# Patient Record
Sex: MALE | Race: WHITE | NOT HISPANIC OR LATINO | Employment: FULL TIME | URBAN - METROPOLITAN AREA
[De-identification: names, ages, dates, MRNs, and addresses within clinical notes are randomized per-mention and may not be internally consistent; named-entity substitution may affect disease eponyms.]

---

## 2024-06-24 ENCOUNTER — OFFICE VISIT (OUTPATIENT)
Dept: INTERNAL MEDICINE | Facility: CLINIC | Age: 19
End: 2024-06-24
Payer: COMMERCIAL

## 2024-06-24 VITALS
HEIGHT: 71 IN | WEIGHT: 172.4 LBS | SYSTOLIC BLOOD PRESSURE: 112 MMHG | RESPIRATION RATE: 14 BRPM | TEMPERATURE: 97.3 F | HEART RATE: 72 BPM | BODY MASS INDEX: 24.14 KG/M2 | DIASTOLIC BLOOD PRESSURE: 86 MMHG

## 2024-06-24 DIAGNOSIS — Z00.00 WELLNESS EXAMINATION: Primary | ICD-10-CM

## 2024-06-24 DIAGNOSIS — Z13.6 ENCOUNTER FOR LIPID SCREENING FOR CARDIOVASCULAR DISEASE: ICD-10-CM

## 2024-06-24 DIAGNOSIS — Z13.220 ENCOUNTER FOR LIPID SCREENING FOR CARDIOVASCULAR DISEASE: ICD-10-CM

## 2024-06-24 LAB
ALBUMIN SERPL-MCNC: 4.8 G/DL (ref 3.5–5.2)
ALBUMIN/GLOB SERPL: 2.3 G/DL
ALP SERPL-CCNC: 60 U/L (ref 39–117)
ALT SERPL W P-5'-P-CCNC: 13 U/L (ref 1–41)
ANION GAP SERPL CALCULATED.3IONS-SCNC: 10.5 MMOL/L (ref 5–15)
AST SERPL-CCNC: 23 U/L (ref 1–40)
BASOPHILS # BLD AUTO: 0.02 10*3/MM3 (ref 0–0.2)
BASOPHILS NFR BLD AUTO: 0.3 % (ref 0–1.5)
BILIRUB SERPL-MCNC: 0.9 MG/DL (ref 0–1.2)
BUN SERPL-MCNC: 12 MG/DL (ref 6–20)
BUN/CREAT SERPL: 10.8 (ref 7–25)
CALCIUM SPEC-SCNC: 9.8 MG/DL (ref 8.6–10.5)
CHLORIDE SERPL-SCNC: 103 MMOL/L (ref 98–107)
CHOLEST SERPL-MCNC: 153 MG/DL (ref 0–200)
CO2 SERPL-SCNC: 26.5 MMOL/L (ref 22–29)
CREAT SERPL-MCNC: 1.11 MG/DL (ref 0.76–1.27)
DEPRECATED RDW RBC AUTO: 38.8 FL (ref 37–54)
EGFRCR SERPLBLD CKD-EPI 2021: 96.9 ML/MIN/1.73
EOSINOPHIL # BLD AUTO: 0.07 10*3/MM3 (ref 0–0.4)
EOSINOPHIL NFR BLD AUTO: 1.1 % (ref 0.3–6.2)
ERYTHROCYTE [DISTWIDTH] IN BLOOD BY AUTOMATED COUNT: 11.9 % (ref 12.3–15.4)
GLOBULIN UR ELPH-MCNC: 2.1 GM/DL
GLUCOSE SERPL-MCNC: 99 MG/DL (ref 65–99)
HCT VFR BLD AUTO: 45.9 % (ref 37.5–51)
HDLC SERPL-MCNC: 63 MG/DL (ref 40–60)
HGB BLD-MCNC: 16.1 G/DL (ref 13–17.7)
IMM GRANULOCYTES # BLD AUTO: 0.01 10*3/MM3 (ref 0–0.05)
IMM GRANULOCYTES NFR BLD AUTO: 0.2 % (ref 0–0.5)
LDLC SERPL CALC-MCNC: 77 MG/DL (ref 0–100)
LDLC/HDLC SERPL: 1.23 {RATIO}
LYMPHOCYTES # BLD AUTO: 1.65 10*3/MM3 (ref 0.7–3.1)
LYMPHOCYTES NFR BLD AUTO: 25.5 % (ref 19.6–45.3)
MCH RBC QN AUTO: 31.4 PG (ref 26.6–33)
MCHC RBC AUTO-ENTMCNC: 35.1 G/DL (ref 31.5–35.7)
MCV RBC AUTO: 89.6 FL (ref 79–97)
MONOCYTES # BLD AUTO: 0.42 10*3/MM3 (ref 0.1–0.9)
MONOCYTES NFR BLD AUTO: 6.5 % (ref 5–12)
NEUTROPHILS NFR BLD AUTO: 4.29 10*3/MM3 (ref 1.7–7)
NEUTROPHILS NFR BLD AUTO: 66.4 % (ref 42.7–76)
NRBC BLD AUTO-RTO: 0 /100 WBC (ref 0–0.2)
PLATELET # BLD AUTO: 198 10*3/MM3 (ref 140–450)
PMV BLD AUTO: 11.1 FL (ref 6–12)
POTASSIUM SERPL-SCNC: 4.9 MMOL/L (ref 3.5–5.2)
PROT SERPL-MCNC: 6.9 G/DL (ref 6–8.5)
RBC # BLD AUTO: 5.12 10*6/MM3 (ref 4.14–5.8)
SODIUM SERPL-SCNC: 140 MMOL/L (ref 136–145)
TRIGL SERPL-MCNC: 63 MG/DL (ref 0–150)
TSH SERPL DL<=0.05 MIU/L-ACNC: 2.24 UIU/ML (ref 0.27–4.2)
VLDLC SERPL-MCNC: 13 MG/DL (ref 5–40)
WBC NRBC COR # BLD AUTO: 6.46 10*3/MM3 (ref 3.4–10.8)

## 2024-06-24 PROCEDURE — 85025 COMPLETE CBC W/AUTO DIFF WBC: CPT | Performed by: NURSE PRACTITIONER

## 2024-06-24 PROCEDURE — 36415 COLL VENOUS BLD VENIPUNCTURE: CPT | Performed by: NURSE PRACTITIONER

## 2024-06-24 PROCEDURE — 80061 LIPID PANEL: CPT | Performed by: NURSE PRACTITIONER

## 2024-06-24 PROCEDURE — 84443 ASSAY THYROID STIM HORMONE: CPT | Performed by: NURSE PRACTITIONER

## 2024-06-24 PROCEDURE — 99395 PREV VISIT EST AGE 18-39: CPT | Performed by: NURSE PRACTITIONER

## 2024-06-24 PROCEDURE — 80053 COMPREHEN METABOLIC PANEL: CPT | Performed by: NURSE PRACTITIONER

## 2024-06-24 NOTE — PROGRESS NOTES
Male Physical Note      Patient Name: Robb Farias  : 2005   MRN: [id number]     Chief Complaint:    Chief Complaint   Patient presents with    Abdominal Pain     New patient. Patient had abdominal pain and nausea after eating last week  Annual Wellness        History of Present Illness: Robb Farias is a 19 y.o. male who is here today for their annual health maintenance and physical.    History of Present Illness  The patient presents for evaluation of multiple medical concerns.    The patient experienced gastrointestinal discomfort approximately 2 weeks ago, which has since resolved. He experienced postprandial abdominal pain after consuming only 2 bites of food during a vacation last week, which was alleviated by defecation. Currently, he reports no abdominal pain, nausea, vomiting, or constipation.   His sleep patterns are normal. He has no history of abdominal issues.   He experienced a headache last week, which has since improved.   He denies any urinary or bowel irregularities.   His mood remains stable, and he denies any symptoms of depression or anxiety.   He denies any systemic symptoms such as fever or chills.   He drinks 3 drinks of alcohol a week. He used to use marijuana, but not anymore. He works as a .   He is not aware of any colon or prostate cancer in his family as he is adopted.        Subjective      Review of Systems:   Review of Systems    Past Medical History: History reviewed. No pertinent past medical history.    Past Surgical History: History reviewed. No pertinent surgical history.    Family History: History reviewed. No pertinent family history.    Social History:   Social History     Socioeconomic History    Marital status: Single   Tobacco Use    Smoking status: Never    Smokeless tobacco: Never   Vaping Use    Vaping status: Never Used   Substance and Sexual Activity    Alcohol use: Yes     Alcohol/week: 3.0 standard drinks of alcohol     Comment: Here and there     "Drug use: Yes     Types: Marijuana     Comment: I stop 10 days age    Sexual activity: Yes     Partners: Female     Birth control/protection: Condom       Medications:   No current outpatient medications on file.    Allergies:   No Known Allergies      Colorectal Screening:     Last Completed Colonoscopy       This patient has no relevant Health Maintenance data.           Depression: PHQ-2/9 Depression Screening  Little interest or pleasure in doing things?     Feeling down, depressed, or hopeless?     PHQ-2 Total Score     PHQ-9 Total Score 0       Objective     Physical Exam:  Vital Signs:   Vitals:    06/24/24 1047   BP: 112/86   BP Location: Right arm   Patient Position: Sitting   Cuff Size: Adult   Pulse: 72   Resp: 14   Temp: 97.3 °F (36.3 °C)   TempSrc: Infrared   Weight: 78.2 kg (172 lb 6.4 oz)   Height: 180.3 cm (71\")   PainSc: 0-No pain     Body mass index is 24.04 kg/m². BMI is within normal parameters. No other follow-up for BMI required.    Information provided on after visit summary.    Physical Exam  Vitals and nursing note reviewed.   Constitutional:       General: He is not in acute distress.     Appearance: Normal appearance. He is not ill-appearing.   HENT:      Head: Normocephalic.      Right Ear: There is impacted cerumen.      Left Ear: Tympanic membrane, ear canal and external ear normal. There is no impacted cerumen.      Nose: No nasal tenderness or rhinorrhea.      Mouth/Throat:      Mouth: Mucous membranes are moist.      Pharynx: Oropharynx is clear. No oropharyngeal exudate or posterior oropharyngeal erythema.   Eyes:      General:         Right eye: No discharge.         Left eye: No discharge.      Extraocular Movements: Extraocular movements intact.      Conjunctiva/sclera: Conjunctivae normal.      Pupils: Pupils are equal, round, and reactive to light.   Neck:      Thyroid: No thyromegaly.   Cardiovascular:      Rate and Rhythm: Normal rate and regular rhythm.      Pulses: Normal " pulses.      Heart sounds: Normal heart sounds. No murmur heard.     No gallop.   Pulmonary:      Effort: Pulmonary effort is normal.      Breath sounds: Normal breath sounds. No wheezing, rhonchi or rales.   Abdominal:      General: Bowel sounds are normal.      Palpations: Abdomen is soft. There is no mass.      Tenderness: There is no abdominal tenderness. There is no right CVA tenderness or left CVA tenderness.   Musculoskeletal:         General: No swelling or tenderness. Normal range of motion.      Cervical back: Normal range of motion.      Right lower leg: No edema.      Left lower leg: No edema.   Lymphadenopathy:      Cervical: No cervical adenopathy.   Skin:     General: Skin is warm and dry.      Capillary Refill: Capillary refill takes less than 2 seconds.      Findings: No erythema or rash.   Neurological:      General: No focal deficit present.      Mental Status: He is alert and oriented to person, place, and time.      Motor: No weakness.   Psychiatric:         Mood and Affect: Mood normal.         Behavior: Behavior is cooperative.         Cognition and Memory: He does not exhibit impaired recent memory.       Physical Exam      Procedures    Assessment / Plan      Assessment/Plan:   Diagnoses and all orders for this visit:    1. Wellness examination (Primary)  -     Comprehensive Metabolic Panel; Future  -     CBC & Differential; Future  -     TSH Rfx On Abnormal To Free T4; Future    2. Encounter for lipid screening for cardiovascular disease  -     Lipid Panel; Future       Assessment & Plan  1. Abdominal pain.  The patient's abdominal discomfort has shown improvement. The patient is advised to monitor his condition. Should the abdominal pain recur, a follow-up appointment will be scheduled.    2. Cerumen impaction.  The patient is recommended to use over-the-counter medication to soften the cerumen. Should the cerumen impaction persist, the patient is to inform us, and we will proceed with an  ear irrigation procedure.      I explained and discussed patient's condition and plan of care.  Discussed when to follow-up.  Discussed possible red flags and how to follow-up with those.  Viewed patient's medications and discussed common side effects. Patient to continue current medications as advised.  Be compliant with medications. Patient to let me know if he has any changes in health, does not tolerate medication, or any future concerns about treatment. Patient verbalized understanding and agreement with plan of care.   Discussed current problems may cause a copay for this visit. Patient verbalized an understanding and agreement of plan.    Follow Up:   No follow-ups on file.  Patient or patient representative verbalized consent for the use of Ambient Listening during the visit with  ALYX Best for chart documentation. 6/24/2024  11:06 EDT      Health Maintenance, Male  A healthy lifestyle and preventive care is important for your health and wellness. Ask your health care provider about what schedule of regular examinations is right for you.  What should I know about weight and diet?    Eat a Healthy Diet  Eat plenty of vegetables, fruits, whole grains, low-fat dairy products, and lean protein.  Do not eat a lot of foods high in solid fats, added sugars, or salt.     Maintain a Healthy Weight  Regular exercise can help you achieve or maintain a healthy weight. You should:  Do at least 150 minutes of exercise each week. The exercise should increase your heart rate and make you sweat (moderate-intensity exercise).  Do strength-training exercises at least twice a week.     Watch Your Levels of Cholesterol and Blood Lipids  Have your blood tested for lipids and cholesterol every 5 years starting at 35 years of age. If you are at high risk for heart disease, you should start having your blood tested when you are 20 years old. You may need to have your cholesterol levels checked more often if:  Your lipid  or cholesterol levels are high.  You are older than 50 years of age.  You are at high risk for heart disease.     What should I know about cancer screening?  Many types of cancers can be detected early and may often be prevented.  Lung Cancer  You should be screened every year for lung cancer if:  You are a current smoker who has smoked for at least 30 years.  You are a former smoker who has quit within the past 15 years.  Talk to your health care provider about your screening options, when you should start screening, and how often you should be screened.     Colorectal Cancer  Routine colorectal cancer screening usually begins at 50 years of age and should be repeated every 5-10 years until you are 75 years old. You may need to be screened more often if early forms of precancerous polyps or small growths are found. Your health care provider may recommend screening at an earlier age if you have risk factors for colon cancer.  Your health care provider may recommend using home test kits to check for hidden blood in the stool.  A small camera at the end of a tube can be used to examine your colon (sigmoidoscopy or colonoscopy). This checks for the earliest forms of colorectal cancer.     Prostate and Testicular Cancer  Depending on your age and overall health, your health care provider may do certain tests to screen for prostate and testicular cancer.  Talk to your health care provider about any symptoms or concerns you have about testicular or prostate cancer.     Skin Cancer  Check your skin from head to toe regularly.  Tell your health care provider about any new moles or changes in moles, especially if:  There is a change in a mole’s size, shape, or color.  You have a mole that is larger than a pencil eraser.  Always use sunscreen. Apply sunscreen liberally and repeat throughout the day.  Protect yourself by wearing long sleeves, pants, a wide-brimmed hat, and sunglasses when outside.     What should I know about  heart disease, diabetes, and high blood pressure?  If you are 18-39 years of age, have your blood pressure checked every 3-5 years. If you are 40 years of age or older, have your blood pressure checked every year. You should have your blood pressure measured twice--once when you are at a hospital or clinic, and once when you are not at a hospital or clinic. Record the average of the two measurements. To check your blood pressure when you are not at a hospital or clinic, you can use:  An automated blood pressure machine at a pharmacy.  A home blood pressure monitor.  Talk to your health care provider about your target blood pressure.  If you are between 45-79 years old, ask your health care provider if you should take aspirin to prevent heart disease.  Have regular diabetes screenings by checking your fasting blood sugar level.  If you are at a normal weight and have a low risk for diabetes, have this test once every three years after the age of 45.  If you are overweight and have a high risk for diabetes, consider being tested at a younger age or more often.  A one-time screening for abdominal aortic aneurysm (AAA) by ultrasound is recommended for men aged 65-75 years who are current or former smokers.  What should I know about preventing infection?  Hepatitis B  If you have a higher risk for hepatitis B, you should be screened for this virus. Talk with your health care provider to find out if you are at risk for hepatitis B infection.  Hepatitis C  Blood testing is recommended for:  Everyone born from 1945 through 1965.  Anyone with known risk factors for hepatitis C.     Sexually Transmitted Diseases (STDs)  You should be screened each year for STDs including gonorrhea and chlamydia if:  You are sexually active and are younger than 24 years of age.  You are older than 24 years of age and your health care provider tells you that you are at risk for this type of infection.  Your sexual activity has changed since you  were last screened and you are at an increased risk for chlamydia or gonorrhea. Ask your health care provider if you are at risk.  Talk with your health care provider about whether you are at high risk of being infected with HIV. Your health care provider may recommend a prescription medicine to help prevent HIV infection.     What else can I do?    Schedule regular health, dental, and eye exams.  Stay current with your vaccines (immunizations).  Do not use any tobacco products, such as cigarettes, chewing tobacco, and e-cigarettes. If you need help quitting, ask your health care provider.  Limit alcohol intake to no more than 2 drinks per day. One drink equals 12 ounces of beer, 5 ounces of wine, or 1½ ounces of hard liquor.  Do not use street drugs.  Do not share needles.  Ask your health care provider for help if you need support or information about quitting drugs.  Tell your health care provider if you often feel depressed.  Tell your health care provider if you have ever been abused or do not feel safe at home.      This information is not intended to replace advice given to you by your health care provider. Make sure you discuss any questions you have with your health care provider.  Document Released: 06/15/2009 Document Revised: 08/16/2017 Document Reviewed: 09/20/2016  [employer] Interactive Patient Education © 2018 [employer]      Robb Farias voices understanding and acceptance of this advice and will call back with any further questions or concerns. AVS with preventive healthcare tips printed for patient.     ALYX Best   [facility] [place]

## 2024-09-05 ENCOUNTER — TELEPHONE (OUTPATIENT)
Dept: INTERNAL MEDICINE | Facility: CLINIC | Age: 19
End: 2024-09-05

## 2024-09-05 NOTE — TELEPHONE ENCOUNTER
"Caller: Robb Fraias \"HILDA\"    Relationship to patient: Self    Best call back number: [phone]     Chief complaint: EARS CLOGGED-WART REMOVAL    Type of visit: IN OFFICE PROCEDURE    Requested date: TODAY     Additional notes:PLEASE CALL AS SOON AS POSSIBLE  "

## 2024-09-11 ENCOUNTER — OFFICE VISIT (OUTPATIENT)
Dept: INTERNAL MEDICINE | Facility: CLINIC | Age: 19
End: 2024-09-11
Payer: COMMERCIAL

## 2024-09-11 VITALS
SYSTOLIC BLOOD PRESSURE: 106 MMHG | DIASTOLIC BLOOD PRESSURE: 68 MMHG | TEMPERATURE: 98.2 F | HEART RATE: 68 BPM | WEIGHT: 178.13 LBS | BODY MASS INDEX: 24.84 KG/M2 | RESPIRATION RATE: 16 BRPM

## 2024-09-11 DIAGNOSIS — B07.9 VIRAL WART ON FINGER: Primary | ICD-10-CM

## 2024-09-11 PROCEDURE — 99213 OFFICE O/P EST LOW 20 MIN: CPT | Performed by: INTERNAL MEDICINE

## 2024-09-11 RX ORDER — IMIQUIMOD 12.5 MG/.25G
CREAM TOPICAL
Qty: 12 EACH | Refills: 2 | Status: SHIPPED | OUTPATIENT
Start: 2024-09-12

## 2024-09-11 NOTE — PROGRESS NOTES
Chief Complaint  Verrucous Vulgaris (Left index finger) and Cerumen Impaction (Both ears)    Subjective    Robb Farias is a 19 y.o. male.     Robb Farias presents to [facility] for     History of Present Illness  The patient presents for evaluation of multiple medical concerns.    He experienced a sudden loss of hearing today, which he attempted to alleviate by using an over-the-counter product from [employer].    He has been dealing with a wart on his left index finger for approximately a year. The wart occasionally grows, at which point he resorts to cutting it off. He has previously tried freezing treatments without success. He also used an over-the-counter Band-Aid for the wart.       The following portions of the patient's history were reviewed and updated as appropriate: allergies, current medications, past family history, past medical history, past social history, past surgical history, and problem list.    Review of Systems    Objective   Body mass index is 24.84 kg/m².  BMI is within normal parameters. No other follow-up for BMI required.       Vital Signs:   /68 (BP Location: Right arm, Patient Position: Sitting, Cuff Size: Adult)   Pulse 68   Temp 98.2 °F (36.8 °C) (Temporal)   Resp 16   Wt 80.8 kg (178 lb 2 oz)   BMI 24.84 kg/m²       Physical Exam  Patient is alert x3, no distress.  Head is normocephalic, atraumatic. Pupils equal, light accommodation. Extraocular muscles intact. Right ear canal is completely occluded with dark brown cerumen. Left tympanic membrane is clear.  On the left index finger, digit number 2, there is a small papular wart. Over-the-counter remedies have been tried but have not proven beneficial or relieving.       Results              Assessment and Plan  Diagnoses and all orders for this visit:  Assessment & Plan  1. Right cerumen impaction.  Cerumen was successfully removed using a peroxide-based solution, which he  tolerated well.    2. Left index finger wart.  Topical cryonitro application was administered. He is instructed to apply Aldara 5% cream (imiquimod) twice a week. Potential side effects and precautions associated with the wart treatment were discussed.    Follow-up  He will follow up in 6 weeks to monitor the progress of the wart treatment.       Diagnoses and all orders for this visit:    1. Viral wart on finger (Primary)  -     imiquimod (ALDARA) 5 % cream; Apply to wart on left index finger  twice a week  Dispense: 12 each; Refill: 2      Side effects and precautions of the new medication(s) have been discussed with patient  I have answered patients questions thoroughly to their understanding.  If patient should experience any side effects from the medication, a follow up appointment should be made.         Follow Up   No follow-ups on file.  Patient was given instructions and counseling regarding his condition or for health maintenance advice. Please see specific information pulled into the AVS if appropriate.     Patient or patient representative verbalized consent for the use of Ambient Listening during the visit with  Alex Zabala MD for chart documentation. 9/11/2024  15:18 EDT

## 2024-11-11 ENCOUNTER — HOSPITAL ENCOUNTER (EMERGENCY)
Facility: HOSPITAL | Age: 19
Discharge: HOME | End: 2024-11-11
Attending: EMERGENCY MEDICINE | Admitting: EMERGENCY MEDICINE
Payer: COMMERCIAL

## 2024-11-11 ENCOUNTER — APPOINTMENT (OUTPATIENT)
Dept: GENERAL RADIOLOGY | Facility: HOSPITAL | Age: 19
End: 2024-11-11
Payer: COMMERCIAL

## 2024-11-11 VITALS
HEART RATE: 86 BPM | OXYGEN SATURATION: 98 % | SYSTOLIC BLOOD PRESSURE: 127 MMHG | RESPIRATION RATE: 16 BRPM | BODY MASS INDEX: 24.5 KG/M2 | WEIGHT: 175 LBS | DIASTOLIC BLOOD PRESSURE: 78 MMHG | HEIGHT: 71 IN | TEMPERATURE: 99.3 F

## 2024-11-11 DIAGNOSIS — R07.9 CHEST PAIN, UNSPECIFIED TYPE: ICD-10-CM

## 2024-11-11 DIAGNOSIS — B34.9 ACUTE VIRAL SYNDROME: Primary | ICD-10-CM

## 2024-11-11 DIAGNOSIS — R82.4 KETONURIA: ICD-10-CM

## 2024-11-11 DIAGNOSIS — R13.10 ODYNOPHAGIA: ICD-10-CM

## 2024-11-11 DIAGNOSIS — E86.0 DEHYDRATION: ICD-10-CM

## 2024-11-11 LAB
ALBUMIN SERPL-MCNC: 4.3 G/DL (ref 3.5–5.2)
ALBUMIN/GLOB SERPL: 1.5 G/DL
ALP SERPL-CCNC: 61 U/L (ref 39–117)
ALT SERPL W P-5'-P-CCNC: 27 U/L (ref 1–41)
ANION GAP SERPL CALCULATED.3IONS-SCNC: 10 MMOL/L (ref 5–15)
AST SERPL-CCNC: 37 U/L (ref 1–40)
B PARAPERT DNA SPEC QL NAA+PROBE: NOT DETECTED
B PERT DNA SPEC QL NAA+PROBE: NOT DETECTED
BASOPHILS # BLD MANUAL: 0.05 10*3/MM3 (ref 0–0.2)
BASOPHILS NFR BLD MANUAL: 1 % (ref 0–1.5)
BILIRUB SERPL-MCNC: 0.5 MG/DL (ref 0–1.2)
BILIRUB UR QL STRIP: ABNORMAL
BUN SERPL-MCNC: 12 MG/DL (ref 6–20)
BUN/CREAT SERPL: 10.3 (ref 7–25)
C PNEUM DNA NPH QL NAA+NON-PROBE: NOT DETECTED
CALCIUM SPEC-SCNC: 9.4 MG/DL (ref 8.6–10.5)
CHLORIDE SERPL-SCNC: 100 MMOL/L (ref 98–107)
CLARITY UR: CLEAR
CO2 SERPL-SCNC: 26 MMOL/L (ref 22–29)
COLOR UR: ABNORMAL
CREAT SERPL-MCNC: 1.17 MG/DL (ref 0.76–1.27)
CRP SERPL-MCNC: 4.14 MG/DL (ref 0–0.5)
D DIMER PPP FEU-MCNC: 0.41 MCGFEU/ML (ref 0–0.5)
DEPRECATED RDW RBC AUTO: 34.5 FL (ref 37–54)
EGFRCR SERPLBLD CKD-EPI 2021: 91 ML/MIN/1.73
EOSINOPHIL # BLD MANUAL: 0 10*3/MM3 (ref 0–0.4)
EOSINOPHIL NFR BLD MANUAL: 0 % (ref 0.3–6.2)
ERYTHROCYTE [DISTWIDTH] IN BLOOD BY AUTOMATED COUNT: 10.8 % (ref 12.3–15.4)
ERYTHROCYTE [SEDIMENTATION RATE] IN BLOOD: 10 MM/HR (ref 0–15)
FLUAV SUBTYP SPEC NAA+PROBE: NOT DETECTED
FLUBV RNA ISLT QL NAA+PROBE: NOT DETECTED
GLOBULIN UR ELPH-MCNC: 2.8 GM/DL
GLUCOSE SERPL-MCNC: 86 MG/DL (ref 65–99)
GLUCOSE UR STRIP-MCNC: NEGATIVE MG/DL
HADV DNA SPEC NAA+PROBE: NOT DETECTED
HCOV 229E RNA SPEC QL NAA+PROBE: NOT DETECTED
HCOV HKU1 RNA SPEC QL NAA+PROBE: NOT DETECTED
HCOV NL63 RNA SPEC QL NAA+PROBE: NOT DETECTED
HCOV OC43 RNA SPEC QL NAA+PROBE: NOT DETECTED
HCT VFR BLD AUTO: 41.9 % (ref 37.5–51)
HETEROPH AB SER QL LA: NEGATIVE
HGB BLD-MCNC: 14.9 G/DL (ref 13–17.7)
HGB UR QL STRIP.AUTO: NEGATIVE
HMPV RNA NPH QL NAA+NON-PROBE: NOT DETECTED
HOLD SPECIMEN: NORMAL
HPIV1 RNA ISLT QL NAA+PROBE: NOT DETECTED
HPIV2 RNA SPEC QL NAA+PROBE: NOT DETECTED
HPIV3 RNA NPH QL NAA+PROBE: NOT DETECTED
HPIV4 P GENE NPH QL NAA+PROBE: NOT DETECTED
KETONES UR QL STRIP: ABNORMAL
LEUKOCYTE ESTERASE UR QL STRIP.AUTO: NEGATIVE
LYMPHOCYTES # BLD MANUAL: 1.56 10*3/MM3 (ref 0.7–3.1)
LYMPHOCYTES NFR BLD MANUAL: 22 % (ref 5–12)
M PNEUMO IGG SER IA-ACNC: NOT DETECTED
MCH RBC QN AUTO: 30.7 PG (ref 26.6–33)
MCHC RBC AUTO-ENTMCNC: 35.6 G/DL (ref 31.5–35.7)
MCV RBC AUTO: 86.4 FL (ref 79–97)
MONOCYTES # BLD: 1.07 10*3/MM3 (ref 0.1–0.9)
NEUTROPHILS # BLD AUTO: 2.19 10*3/MM3 (ref 1.7–7)
NEUTROPHILS NFR BLD MANUAL: 38 % (ref 42.7–76)
NEUTS BAND NFR BLD MANUAL: 7 % (ref 0–5)
NITRITE UR QL STRIP: NEGATIVE
NRBC SPEC MANUAL: 0 /100 WBC (ref 0–0.2)
PH UR STRIP.AUTO: 6.5 [PH] (ref 5–8)
PLAT MORPH BLD: NORMAL
PLATELET # BLD AUTO: 149 10*3/MM3 (ref 140–450)
PMV BLD AUTO: 10.7 FL (ref 6–12)
POTASSIUM SERPL-SCNC: 4.2 MMOL/L (ref 3.5–5.2)
PROT SERPL-MCNC: 7.1 G/DL (ref 6–8.5)
PROT UR QL STRIP: ABNORMAL
RBC # BLD AUTO: 4.85 10*6/MM3 (ref 4.14–5.8)
RBC MORPH BLD: NORMAL
RHINOVIRUS RNA SPEC NAA+PROBE: NOT DETECTED
RSV RNA NPH QL NAA+NON-PROBE: NOT DETECTED
SARS-COV-2 RNA NPH QL NAA+NON-PROBE: NOT DETECTED
SODIUM SERPL-SCNC: 136 MMOL/L (ref 136–145)
SP GR UR STRIP: 1.02 (ref 1–1.03)
TROPONIN T SERPL HS-MCNC: <6 NG/L
UROBILINOGEN UR QL STRIP: ABNORMAL
VARIANT LYMPHS NFR BLD MANUAL: 25 % (ref 19.6–45.3)
VARIANT LYMPHS NFR BLD MANUAL: 7 % (ref 0–5)
WBC MORPH BLD: NORMAL
WBC NRBC COR # BLD AUTO: 4.86 10*3/MM3 (ref 3.4–10.8)
WHOLE BLOOD HOLD SPECIMEN: NORMAL

## 2024-11-11 PROCEDURE — 0202U NFCT DS 22 TRGT SARS-COV-2: CPT | Performed by: EMERGENCY MEDICINE

## 2024-11-11 PROCEDURE — 93005 ELECTROCARDIOGRAM TRACING: CPT | Performed by: EMERGENCY MEDICINE

## 2024-11-11 PROCEDURE — 86308 HETEROPHILE ANTIBODY SCREEN: CPT | Performed by: EMERGENCY MEDICINE

## 2024-11-11 PROCEDURE — 71045 X-RAY EXAM CHEST 1 VIEW: CPT

## 2024-11-11 PROCEDURE — 85025 COMPLETE CBC W/AUTO DIFF WBC: CPT | Performed by: EMERGENCY MEDICINE

## 2024-11-11 PROCEDURE — 99284 EMERGENCY DEPT VISIT MOD MDM: CPT

## 2024-11-11 PROCEDURE — 86140 C-REACTIVE PROTEIN: CPT | Performed by: EMERGENCY MEDICINE

## 2024-11-11 PROCEDURE — 93005 ELECTROCARDIOGRAM TRACING: CPT

## 2024-11-11 PROCEDURE — 81003 URINALYSIS AUTO W/O SCOPE: CPT | Performed by: EMERGENCY MEDICINE

## 2024-11-11 PROCEDURE — 85652 RBC SED RATE AUTOMATED: CPT | Performed by: EMERGENCY MEDICINE

## 2024-11-11 PROCEDURE — 85379 FIBRIN DEGRADATION QUANT: CPT | Performed by: EMERGENCY MEDICINE

## 2024-11-11 PROCEDURE — 84484 ASSAY OF TROPONIN QUANT: CPT | Performed by: EMERGENCY MEDICINE

## 2024-11-11 PROCEDURE — 36415 COLL VENOUS BLD VENIPUNCTURE: CPT

## 2024-11-11 PROCEDURE — 80053 COMPREHEN METABOLIC PANEL: CPT | Performed by: EMERGENCY MEDICINE

## 2024-11-11 PROCEDURE — 85007 BL SMEAR W/DIFF WBC COUNT: CPT | Performed by: EMERGENCY MEDICINE

## 2024-11-11 RX ORDER — ALUMINA, MAGNESIA, AND SIMETHICONE 2400; 2400; 240 MG/30ML; MG/30ML; MG/30ML
15 SUSPENSION ORAL ONCE
Status: COMPLETED | OUTPATIENT
Start: 2024-11-11 | End: 2024-11-11

## 2024-11-11 RX ORDER — SODIUM CHLORIDE 0.9 % (FLUSH) 0.9 %
10 SYRINGE (ML) INJECTION AS NEEDED
Status: DISCONTINUED | OUTPATIENT
Start: 2024-11-11 | End: 2024-11-11 | Stop reason: HOSPADM

## 2024-11-11 RX ORDER — KETOROLAC TROMETHAMINE 15 MG/ML
15 INJECTION, SOLUTION INTRAMUSCULAR; INTRAVENOUS ONCE
Status: DISCONTINUED | OUTPATIENT
Start: 2024-11-11 | End: 2024-11-11

## 2024-11-11 RX ORDER — FAMOTIDINE 10 MG/ML
20 INJECTION, SOLUTION INTRAVENOUS ONCE
Status: DISCONTINUED | OUTPATIENT
Start: 2024-11-11 | End: 2024-11-11

## 2024-11-11 RX ORDER — SUCRALFATE 1 G/1
1 TABLET ORAL ONCE
Status: COMPLETED | OUTPATIENT
Start: 2024-11-11 | End: 2024-11-11

## 2024-11-11 RX ORDER — FAMOTIDINE 20 MG/1
20 TABLET, FILM COATED ORAL 2 TIMES DAILY
Qty: 10 TABLET | Refills: 0 | Status: SHIPPED | OUTPATIENT
Start: 2024-11-11

## 2024-11-11 RX ORDER — PANTOPRAZOLE SODIUM 40 MG/1
40 TABLET, DELAYED RELEASE ORAL DAILY
Qty: 8 TABLET | Refills: 0 | Status: SHIPPED | OUTPATIENT
Start: 2024-11-11

## 2024-11-11 RX ORDER — SUCRALFATE 1 G/1
1 TABLET ORAL 4 TIMES DAILY
Qty: 20 TABLET | Refills: 0 | Status: SHIPPED | OUTPATIENT
Start: 2024-11-11

## 2024-11-11 RX ORDER — ONDANSETRON 4 MG/1
4 TABLET, FILM COATED ORAL EVERY 6 HOURS PRN
Qty: 8 TABLET | Refills: 0 | Status: SHIPPED | OUTPATIENT
Start: 2024-11-11 | End: 2024-11-13

## 2024-11-11 RX ADMIN — SUCRALFATE 1 G: 1 TABLET ORAL at 15:52

## 2024-11-11 RX ADMIN — ALUMINUM HYDROXIDE, MAGNESIUM HYDROXIDE, DIMETHICONE 15 ML: 400; 400; 40 SUSPENSION ORAL at 15:52

## 2024-11-11 NOTE — ED PROVIDER NOTES
Subjective   History of Present Illness  Patient is a pleasant 19-year-old male without significant past medical history presents to the emergency department with chest pain along with some flulike symptoms.  States that on Friday developed chest pain and chills.  Over the weekend this persisted and included generalized headache and increased fatigue.  Last night to the chest discomfort became more pronounced.  Is especially worse when he swallows.  Took a Prilosec this morning but did not have significant change.  He has had chills but no definitive fever.  Denies abdominal pain, vomiting, diarrhea.  Denies similar episodes in the past.  Denies known sick contacts.  No recent immunizations.  No recent significant travel or immobilization.        Review of Systems   All other systems reviewed and are negative.      No past medical history on file.    No Known Allergies    No past surgical history on file.    No family history on file.    Social History     Socioeconomic History    Marital status: Single   Tobacco Use    Smoking status: Never     Passive exposure: Current    Smokeless tobacco: Never   Vaping Use    Vaping status: Never Used   Substance and Sexual Activity    Alcohol use: Yes     Alcohol/week: 3.0 standard drinks of alcohol     Comment: Here and there    Drug use: Not Currently     Types: Marijuana     Comment: I stop 10 days ago    Sexual activity: Yes     Partners: Female     Birth control/protection: Condom           Objective   Physical Exam  Vitals and nursing note reviewed.   Constitutional:       General: He is not in acute distress.  HENT:      Head: Normocephalic and atraumatic.   Eyes:      Conjunctiva/sclera: Conjunctivae normal.      Pupils: Pupils are equal, round, and reactive to light.   Neck:      Thyroid: No thyromegaly.   Cardiovascular:      Rate and Rhythm: Normal rate and regular rhythm.      Heart sounds: Normal heart sounds. No murmur heard.     No friction rub. No gallop.    Pulmonary:      Effort: Pulmonary effort is normal. No respiratory distress.      Breath sounds: Normal breath sounds.   Chest:      Chest wall: No tenderness.   Abdominal:      Palpations: Abdomen is soft.      Tenderness: There is no abdominal tenderness.   Musculoskeletal:         General: Normal range of motion.      Cervical back: Normal range of motion and neck supple.   Lymphadenopathy:      Cervical: No cervical adenopathy.   Skin:     General: Skin is warm and dry.   Neurological:      Mental Status: He is alert and oriented to person, place, and time.         Procedures           ED Course      Recent Results (from the past 24 hours)   Urinalysis With Microscopic If Indicated (No Culture) - Urine, Clean Catch    Collection Time: 11/11/24  3:00 PM    Specimen: Urine, Clean Catch   Result Value Ref Range    Color, UA Dark Yellow (A) Yellow, Straw    Appearance, UA Clear Clear    pH, UA 6.5 5.0 - 8.0    Specific Gravity, UA 1.020 1.001 - 1.030    Glucose, UA Negative Negative    Ketones, UA >=160 mg/dL (4+) (A) Negative    Bilirubin, UA Small (1+) (A) Negative    Blood, UA Negative Negative    Protein, UA Trace (A) Negative    Leuk Esterase, UA Negative Negative    Nitrite, UA Negative Negative    Urobilinogen, UA 0.2 E.U./dL 0.2 - 1.0 E.U./dL   Respiratory Panel PCR w/COVID-19(SARS-CoV-2) ANGELIC/MARTHA/OLIVIA/PAD/COR/FORTINO In-House, NP Swab in [facility], 2 HR TAT - Swab, Nasopharynx    Collection Time: 11/11/24  3:00 PM    Specimen: Nasopharynx; Swab   Result Value Ref Range    ADENOVIRUS, PCR Not Detected Not Detected    Coronavirus 229E Not Detected Not Detected    Coronavirus HKU1 Not Detected Not Detected    Coronavirus NL63 Not Detected Not Detected    Coronavirus OC43 Not Detected Not Detected    COVID19 Not Detected Not Detected - Ref. Range    Human Metapneumovirus Not Detected Not Detected    Human Rhinovirus/Enterovirus Not Detected Not Detected    Influenza A PCR Not Detected Not Detected    Influenza B PCR  Not Detected Not Detected    Parainfluenza Virus 1 Not Detected Not Detected    Parainfluenza Virus 2 Not Detected Not Detected    Parainfluenza Virus 3 Not Detected Not Detected    Parainfluenza Virus 4 Not Detected Not Detected    RSV, PCR Not Detected Not Detected    Bordetella pertussis pcr Not Detected Not Detected    Bordetella parapertussis PCR Not Detected Not Detected    Chlamydophila pneumoniae PCR Not Detected Not Detected    Mycoplasma pneumo by PCR Not Detected Not Detected   D-dimer, Quantitative    Collection Time: 11/11/24  3:04 PM    Specimen: Blood   Result Value Ref Range    D-Dimer, Quantitative 0.41 0.00 - 0.50 MCGFEU/mL     Note: In addition to lab results from this visit, the labs listed above may include labs taken at another facility or during a different encounter within the last 24 hours. Please correlate lab times with ED admission and discharge times for further clarification of the services performed during this visit.    XR Chest 1 View   Final Result   Impression:      1. No acute cardiopulmonary disease.         Electronically Signed: Antonio Rico MD     11/11/2024 1:42 PM EST     Workstation ID: [id number]        Vitals:    11/11/24 1530 11/11/24 1600 11/11/24 1630 11/11/24 1700   BP: 125/84 126/86 123/81 127/78   BP Location:       Patient Position:       Pulse: 81 90 80 86   Resp:       Temp:       TempSrc:       SpO2: 100% 100% 98% 98%   Weight:       Height:         Medications   sucralfate (CARAFATE) tablet 1 g (1 g Oral Given 11/11/24 1552)   aluminum-magnesium hydroxide-simethicone (MAALOX MAX) 400-400-40 MG/5ML suspension 15 mL (15 mL Oral Given 11/11/24 1552)     ECG/EMG Results (last 24 hours)       Procedure Component Value Units Date/Time    ECG 12 Lead Chest Pain [[id number]] Collected: 11/11/24 1031     Updated: 11/11/24 1235     QT Interval 368 ms     Narrative:      Test Reason : Chest Pain  Blood Pressure :   */*   mmHG  Vent. Rate :  75 BPM     Atrial Rate :  75  BPM     P-R Int : 182 ms          QRS Dur :  94 ms      QT Int : 368 ms       P-R-T Axes :  55 -11  39 degrees    QTcB Int : 410 ms    Normal sinus rhythm  Incomplete right bundle branch block  Borderline ECG  No previous ECGs available    Referred By: EDMD           Confirmed By:           ECG 12 Lead Chest Pain   Preliminary Result   Test Reason : Chest Pain   Blood Pressure :   */*   mmHG   Vent. Rate :  75 BPM     Atrial Rate :  75 BPM      P-R Int : 182 ms          QRS Dur :  94 ms       QT Int : 368 ms       P-R-T Axes :  55 -11  39 degrees     QTcB Int : 410 ms      Normal sinus rhythm   Incomplete right bundle branch block   Borderline ECG   No previous ECGs available      Referred By: EDMD           Confirmed By:                         No data recorded                             Medical Decision Making      Final diagnoses:   Acute viral syndrome   Chest pain, unspecified type   Odynophagia   Ketonuria   Dehydration       ED Disposition  ED Disposition       ED Disposition   Discharge    Condition   Stable    Comment   --           DISCHARGE    Patient discharged in stable condition.    Reviewed implications of results, diagnosis, meds, responsibility to follow up, warning signs and symptoms of possible worsening, potential complications and reasons to return to ER.    Patient/Family voiced understanding of above instructions.    Discussed plan for discharge, as there is no emergent indication for admission.  Pt/family is agreeable and understands need for follow up and possible repeat testing.  Pt/family is aware that discharge does not mean that nothing is wrong but that it indicates no emergency is currently present that requires admission and they must continue care with follow-up as given below or with a physician of their choice.     FOLLOW-UP  [facility] CARDIOLOGY  [address]  [address]  [facility] [address] [phone]  Schedule an appointment as soon as  possible for a visit       Jackie Chan APRN  [address]  [address]  [place] [id number]  [phone]    Schedule an appointment as soon as possible for a visit       Jodie Garcia MD  [address] [facility] [address]  [place] [id number]  [phone]    Schedule an appointment as soon as possible for a visit       [facility] EMERGENCY DEPARTMENT  [address] [facility] [address] [phone]    If symptoms worsen         Medication List        New Prescriptions      famotidine 20 MG tablet  Commonly known as: PEPCID  Take 1 tablet by mouth 2 (Two) Times a Day.     ondansetron 4 MG tablet  Commonly known as: ZOFRAN  Take 1 tablet by mouth Every 6 (Six) Hours As Needed for Nausea or Vomiting.     pantoprazole 40 MG EC tablet  Commonly known as: PROTONIX  Take 1 tablet by mouth Daily.     sucralfate 1 g tablet  Commonly known as: CARAFATE  Take 1 tablet by mouth 4 (Four) Times a Day.               Where to Get Your Medications        These medications were sent to [facility] [id number] - [place], [place] - [address] [facility] - [phone]  - [phone] FX  [address] [place] [address]      Phone: [phone]   famotidine 20 MG tablet  ondansetron 4 MG tablet  pantoprazole 40 MG EC tablet  sucralfate 1 g tablet            Ramesh Piedra DO  11/12/24 1495

## 2024-11-13 ENCOUNTER — OFFICE VISIT (OUTPATIENT)
Dept: CARDIOLOGY | Facility: HOSPITAL | Age: 19
End: 2024-11-13
Payer: COMMERCIAL

## 2024-11-13 VITALS
SYSTOLIC BLOOD PRESSURE: 122 MMHG | HEIGHT: 71 IN | BODY MASS INDEX: 23.38 KG/M2 | OXYGEN SATURATION: 97 % | DIASTOLIC BLOOD PRESSURE: 76 MMHG | HEART RATE: 86 BPM | WEIGHT: 167 LBS

## 2024-11-13 DIAGNOSIS — R42 DIZZINESS: Primary | ICD-10-CM

## 2024-11-13 DIAGNOSIS — R07.89 ATYPICAL CHEST PAIN: ICD-10-CM

## 2024-11-13 LAB
QT INTERVAL: 368 MS
QTC INTERVAL: 410 MS

## 2024-11-13 NOTE — PROGRESS NOTES
"Chief Complaint  Chest Pain    Subjective    History of Present Illness {  Problem List  Visit  Diagnosis   Encounters  Notes  Medications  Labs  Result Review Imaging  Media :23}       History of Present Illness   19-year-old male presents the office today at the request of [facility] ED for ongoing evaluation of chest pain.  He presented to the ED 11/11/2024 with complaints of acute viral syndrome, flulike symptoms.  He was prescribed famotidine, Zofran, pantoprazole and Carafate.  He presents today for follow-up.  EKG in the ER shows normal sinus rhythm with incomplete right bundle branch block. Notes that chest pain has improved after initiation of Pepcid, Protonix, Carafate.  He does report experiencing a headache and intermittent dizziness since onset of symptoms on Friday.  Reports dizziness occurs with position changes.  Objective     Vital Signs:   Vitals:    11/13/24 0834 11/13/24 0835   BP: 119/70 122/76   BP Location: Left arm Left arm   Patient Position: Sitting Standing   Pulse: 79 86   SpO2: 97% 97%   Weight: 75.8 kg (167 lb) 75.8 kg (167 lb)   Height: 180.3 cm (71\") 180.3 cm (71\")     Body mass index is 23.29 kg/m².  Physical Exam  Vitals and nursing note reviewed.   Constitutional:       Appearance: Normal appearance.   HENT:      Head: Normocephalic.   Eyes:      Pupils: Pupils are equal, round, and reactive to light.   Cardiovascular:      Rate and Rhythm: Normal rate and regular rhythm.      Pulses: Normal pulses.      Heart sounds: Normal heart sounds. No murmur heard.  Pulmonary:      Effort: Pulmonary effort is normal.      Breath sounds: Normal breath sounds.   Abdominal:      General: Bowel sounds are normal.      Palpations: Abdomen is soft.   Musculoskeletal:         General: Normal range of motion.      Cervical back: Normal range of motion.      Right lower leg: No edema.      Left lower leg: No edema.   Skin:     General: Skin is warm and dry.      Capillary " Refill: Capillary refill takes less than 2 seconds.   Neurological:      Mental Status: He is alert and oriented to person, place, and time.   Psychiatric:         Mood and Affect: Mood normal.         Thought Content: Thought content normal.              Result Review  Data Reviewed:{ Labs  Result Review  Imaging  Med Tab  Media :23}   ECG 12 Lead Chest Pain (11/11/2024 10:31)   CBC & Differential (11/11/2024 10:57)  Comprehensive Metabolic Panel (11/11/2024 10:57)  Single High Sensitivity Troponin T (11/11/2024 10:57)  Manual Differential (11/11/2024 10:57)  Mononucleosis Screen (11/11/2024 10:57)  Sedimentation Rate (11/11/2024 10:57)  C-reactive Protein (11/11/2024 10:57)  Urinalysis With Microscopic If Indicated (No Culture) - Urine, Clean Catch (11/11/2024 15:00)  COVID PRE-OP / PRE-PROCEDURE SCREENING ORDER (NO ISOLATION) - Swab, Nasopharynx (11/11/2024 15:00)  D-dimer, Quantitative (11/11/2024 15:04)         Assessment and Plan {CC Problem List  Visit Diagnosis  ROS  Review (Popup)  Health Maintenance  Quality  BestPractice  Medications  SmartSets  SnapShot Encounters  Media :23}   1. Dizziness  Encouraged good hydration  Patient did appear dehydrated on UA, labs in ED  - Adult Transthoracic Echo Complete W/ Cont if Necessary Per Protocol; Future    2. Atypical chest pain    - Adult Transthoracic Echo Complete W/ Cont if Necessary Per Protocol; Future          Follow Up {Instructions Charge Capture  Follow-up Communications :23}   Return if symptoms worsen or fail to improve.    Patient was given instructions and counseling regarding his condition or for health maintenance advice. Please see specific information pulled into the AVS if appropriate.  Patient was instructed to call the [facility] with any questions, concerns, or worsening symptoms.

## 2025-03-10 DIAGNOSIS — B07.9 VIRAL WART ON FINGER: ICD-10-CM

## 2025-03-13 RX ORDER — IMIQUIMOD 12.5 MG/.25G
CREAM TOPICAL
Qty: 12 EACH | Refills: 2 | OUTPATIENT
Start: 2025-03-13